# Patient Record
Sex: FEMALE | Race: BLACK OR AFRICAN AMERICAN
[De-identification: names, ages, dates, MRNs, and addresses within clinical notes are randomized per-mention and may not be internally consistent; named-entity substitution may affect disease eponyms.]

---

## 2017-12-31 ENCOUNTER — HOSPITAL ENCOUNTER (EMERGENCY)
Dept: HOSPITAL 84 - D.ER | Age: 67
Discharge: HOME | End: 2017-12-31
Payer: MEDICAID

## 2017-12-31 DIAGNOSIS — N39.0: Primary | ICD-10-CM

## 2017-12-31 LAB
ALBUMIN SERPL-MCNC: 3.7 G/DL (ref 3.4–5)
ALP SERPL-CCNC: 125 U/L (ref 46–116)
ALT SERPL-CCNC: 19 U/L (ref 10–68)
ANION GAP SERPL CALC-SCNC: 11.7 MMOL/L (ref 8–16)
APPEARANCE UR: (no result)
BACTERIA #/AREA URNS HPF: (no result) /HPF
BASOPHILS NFR BLD AUTO: 0.2 % (ref 0–2)
BILIRUB SERPL-MCNC: 0.57 MG/DL (ref 0.2–1.3)
BILIRUB SERPL-MCNC: NEGATIVE MG/DL
BUN SERPL-MCNC: 6 MG/DL (ref 7–18)
CALCIUM SERPL-MCNC: 9.6 MG/DL (ref 8.5–10.1)
CHLORIDE SERPL-SCNC: 103 MMOL/L (ref 98–107)
CO2 SERPL-SCNC: 29.8 MMOL/L (ref 21–32)
COLOR UR: YELLOW
CREAT SERPL-MCNC: 0.5 MG/DL (ref 0.6–1.3)
EOSINOPHIL NFR BLD: 2.8 % (ref 0–7)
ERYTHROCYTE [DISTWIDTH] IN BLOOD BY AUTOMATED COUNT: 19.5 % (ref 11.5–14.5)
GLOBULIN SER-MCNC: 4.2 G/L
GLUCOSE SERPL-MCNC: 104 MG/DL (ref 74–106)
GLUCOSE SERPL-MCNC: NEGATIVE MG/DL
HCT VFR BLD CALC: 32.7 % (ref 36–48)
HGB BLD-MCNC: 11.9 G/DL (ref 12–16)
IMM GRANULOCYTES NFR BLD: 0.1 % (ref 0–5)
KETONES UR STRIP-MCNC: (no result) MG/DL
LYMPHOCYTES NFR BLD AUTO: 21.6 % (ref 15–50)
MCH RBC QN AUTO: 24.9 PG (ref 26–34)
MCHC RBC AUTO-ENTMCNC: 36.4 G/DL (ref 31–37)
MCV RBC: 68.4 FL (ref 80–100)
MONOCYTES NFR BLD: 7.1 % (ref 2–11)
NEUTROPHILS NFR BLD AUTO: 68.2 % (ref 40–80)
NITRITE UR-MCNC: NEGATIVE MG/ML
OSMOLALITY SERPL CALC.SUM OF ELEC: 276 MOSM/KG (ref 275–300)
PH UR STRIP: 7 [PH] (ref 5–6)
PLATELET # BLD: 226 10X3/UL (ref 130–400)
PMV BLD AUTO: 9.3 FL (ref 7.4–10.4)
POTASSIUM SERPL-SCNC: 4.5 MMOL/L (ref 3.5–5.1)
PROT SERPL-MCNC: 7.9 G/DL (ref 6.4–8.2)
PROT UR-MCNC: NEGATIVE MG/DL
RBC # BLD AUTO: 4.78 10X6/UL (ref 4–5.4)
RBC #/AREA URNS HPF: (no result) /HPF (ref 0–5)
SODIUM SERPL-SCNC: 140 MMOL/L (ref 136–145)
SP GR UR STRIP: 1 (ref 1–1.02)
UROBILINOGEN UR-MCNC: NORMAL MG/DL
WBC # BLD AUTO: 8.2 10X3/UL (ref 4.8–10.8)
WBC #/AREA URNS HPF: >50 /HPF (ref 0–5)

## 2018-06-27 ENCOUNTER — HOSPITAL ENCOUNTER (OUTPATIENT)
Dept: HOSPITAL 84 - D.CT | Age: 68
Discharge: HOME | End: 2018-06-27
Attending: FAMILY MEDICINE
Payer: MEDICAID

## 2018-06-27 VITALS — BODY MASS INDEX: 37.3 KG/M2

## 2018-06-27 DIAGNOSIS — N93.9: Primary | ICD-10-CM

## 2018-08-02 ENCOUNTER — HOSPITAL ENCOUNTER (EMERGENCY)
Dept: HOSPITAL 84 - D.ER | Age: 68
Discharge: HOME | End: 2018-08-02
Payer: MEDICAID

## 2018-08-02 VITALS — SYSTOLIC BLOOD PRESSURE: 127 MMHG | DIASTOLIC BLOOD PRESSURE: 63 MMHG

## 2018-08-02 VITALS — HEIGHT: 63 IN | BODY MASS INDEX: 33.73 KG/M2 | WEIGHT: 190.4 LBS

## 2018-08-02 DIAGNOSIS — N39.0: Primary | ICD-10-CM

## 2018-08-02 DIAGNOSIS — Z86.73: ICD-10-CM

## 2018-08-02 DIAGNOSIS — E07.9: ICD-10-CM

## 2018-08-02 DIAGNOSIS — D57.1: ICD-10-CM

## 2018-08-02 LAB
ALBUMIN SERPL-MCNC: 3.1 G/DL (ref 3.4–5)
ALP SERPL-CCNC: 106 U/L (ref 46–116)
ALT SERPL-CCNC: 13 U/L (ref 10–68)
AMYLASE SERPL-CCNC: 32 U/L (ref 25–115)
ANION GAP SERPL CALC-SCNC: 9.6 MMOL/L (ref 8–16)
APPEARANCE UR: (no result)
BACTERIA #/AREA URNS HPF: (no result) /HPF
BASOPHILS NFR BLD AUTO: 0.2 % (ref 0–2)
BILIRUB SERPL-MCNC: 0.76 MG/DL (ref 0.2–1.3)
BILIRUB SERPL-MCNC: NEGATIVE MG/DL
BUN SERPL-MCNC: 9 MG/DL (ref 7–18)
CALCIUM SERPL-MCNC: 8.7 MG/DL (ref 8.5–10.1)
CHLORIDE SERPL-SCNC: 102 MMOL/L (ref 98–107)
CO2 SERPL-SCNC: 28.4 MMOL/L (ref 21–32)
COLOR UR: YELLOW
CREAT SERPL-MCNC: 0.4 MG/DL (ref 0.6–1.3)
EOSINOPHIL NFR BLD: 2.6 % (ref 0–7)
ERYTHROCYTE [DISTWIDTH] IN BLOOD BY AUTOMATED COUNT: 19.5 % (ref 11.5–14.5)
GLOBULIN SER-MCNC: 4 G/L
GLUCOSE SERPL-MCNC: 93 MG/DL (ref 74–106)
GLUCOSE SERPL-MCNC: NEGATIVE MG/DL
HCT VFR BLD CALC: 30.1 % (ref 36–48)
HGB BLD-MCNC: 11 G/DL (ref 12–16)
IMM GRANULOCYTES NFR BLD: 0.2 % (ref 0–5)
KETONES UR STRIP-MCNC: NEGATIVE MG/DL
LIPASE SERPL-CCNC: 87 U/L (ref 73–393)
LYMPHOCYTES NFR BLD AUTO: 16 % (ref 15–50)
MCH RBC QN AUTO: 24 PG (ref 26–34)
MCHC RBC AUTO-ENTMCNC: 36.5 G/DL (ref 31–37)
MCV RBC: 65.7 FL (ref 80–100)
MONOCYTES NFR BLD: 5.5 % (ref 2–11)
MUCOUS THREADS #/AREA URNS LPF: (no result) /LPF
NEUTROPHILS NFR BLD AUTO: 75.5 % (ref 40–80)
NITRITE UR-MCNC: POSITIVE MG/ML
OSMOLALITY SERPL CALC.SUM OF ELEC: 270 MOSM/KG (ref 275–300)
PH UR STRIP: 7 [PH] (ref 5–6)
PLATELET # BLD: 196 10X3/UL (ref 130–400)
PMV BLD AUTO: 9.4 FL (ref 7.4–10.4)
POTASSIUM SERPL-SCNC: 4 MMOL/L (ref 3.5–5.1)
PROT SERPL-MCNC: 7.1 G/DL (ref 6.4–8.2)
PROT UR-MCNC: NEGATIVE MG/DL
RBC # BLD AUTO: 4.58 10X6/UL (ref 4–5.4)
SODIUM SERPL-SCNC: 136 MMOL/L (ref 136–145)
SP GR UR STRIP: 1.01 (ref 1–1.02)
SQUAMOUS #/AREA URNS HPF: (no result) /HPF (ref 0–5)
UROBILINOGEN UR-MCNC: NORMAL MG/DL
WBC # BLD AUTO: 8.8 10X3/UL (ref 4.8–10.8)
WBC #/AREA URNS HPF: (no result) /HPF (ref 0–5)

## 2019-11-13 ENCOUNTER — HOSPITAL ENCOUNTER (INPATIENT)
Dept: HOSPITAL 84 - D.ER | Age: 69
LOS: 2 days | Discharge: NURSING, REHAB OR LONG-TERM CARE | DRG: 392 | End: 2019-11-15
Attending: FAMILY MEDICINE | Admitting: FAMILY MEDICINE
Payer: MEDICARE

## 2019-11-13 VITALS — DIASTOLIC BLOOD PRESSURE: 74 MMHG | SYSTOLIC BLOOD PRESSURE: 182 MMHG

## 2019-11-13 VITALS — SYSTOLIC BLOOD PRESSURE: 153 MMHG | DIASTOLIC BLOOD PRESSURE: 73 MMHG

## 2019-11-13 VITALS — BODY MASS INDEX: 31.57 KG/M2 | WEIGHT: 196.41 LBS | BODY MASS INDEX: 31.57 KG/M2 | HEIGHT: 66 IN

## 2019-11-13 VITALS — DIASTOLIC BLOOD PRESSURE: 103 MMHG | SYSTOLIC BLOOD PRESSURE: 139 MMHG

## 2019-11-13 VITALS — SYSTOLIC BLOOD PRESSURE: 176 MMHG | DIASTOLIC BLOOD PRESSURE: 72 MMHG

## 2019-11-13 DIAGNOSIS — D57.1: ICD-10-CM

## 2019-11-13 DIAGNOSIS — I10: ICD-10-CM

## 2019-11-13 DIAGNOSIS — K59.00: Primary | ICD-10-CM

## 2019-11-13 DIAGNOSIS — Z86.73: ICD-10-CM

## 2019-11-13 DIAGNOSIS — D35.01: ICD-10-CM

## 2019-11-13 DIAGNOSIS — K21.9: ICD-10-CM

## 2019-11-13 LAB
AMYLASE SERPL-CCNC: 39 U/L (ref 25–115)
ANION GAP SERPL CALC-SCNC: 15.3 MMOL/L (ref 8–16)
APPEARANCE UR: CLEAR
BACTERIA #/AREA URNS HPF: (no result) /HPF
BASOPHILS NFR BLD AUTO: 0.2 % (ref 0–2)
BILIRUB SERPL-MCNC: NEGATIVE MG/DL
BUN SERPL-MCNC: 18 MG/DL (ref 7–18)
CALCIUM SERPL-MCNC: 8.9 MG/DL (ref 8.5–10.1)
CHLORIDE SERPL-SCNC: 106 MMOL/L (ref 98–107)
CO2 SERPL-SCNC: 25.8 MMOL/L (ref 21–32)
COLOR UR: YELLOW
CREAT SERPL-MCNC: 0.5 MG/DL (ref 0.6–1.3)
EOSINOPHIL NFR BLD: 3.2 % (ref 0–7)
ERYTHROCYTE [DISTWIDTH] IN BLOOD BY AUTOMATED COUNT: 20.3 % (ref 11.5–14.5)
GLUCOSE SERPL-MCNC: 96 MG/DL (ref 74–106)
GLUCOSE SERPL-MCNC: NEGATIVE MG/DL
HCT VFR BLD CALC: 33.6 % (ref 36–48)
HGB BLD-MCNC: 12.2 G/DL (ref 12–16)
IMM GRANULOCYTES NFR BLD: 0.2 % (ref 0–5)
KETONES UR STRIP-MCNC: NEGATIVE MG/DL
LIPASE SERPL-CCNC: 90 U/L (ref 73–393)
LYMPHOCYTES NFR BLD AUTO: 20.3 % (ref 15–50)
MCH RBC QN AUTO: 25.3 PG (ref 26–34)
MCHC RBC AUTO-ENTMCNC: 36.3 G/DL (ref 31–37)
MCV RBC: 69.6 FL (ref 80–100)
MONOCYTES NFR BLD: 6.3 % (ref 2–11)
NEUTROPHILS NFR BLD AUTO: 69.8 % (ref 40–80)
NITRITE UR-MCNC: NEGATIVE MG/ML
OSMOLALITY SERPL CALC.SUM OF ELEC: 286 MOSM/KG (ref 275–300)
PH UR STRIP: 6 [PH] (ref 5–6)
PLATELET # BLD: 188 10X3/UL (ref 130–400)
PMV BLD AUTO: 9.6 FL (ref 7.4–10.4)
POTASSIUM SERPL-SCNC: 4.1 MMOL/L (ref 3.5–5.1)
PROT UR-MCNC: NEGATIVE MG/DL
RBC # BLD AUTO: 4.83 10X6/UL (ref 4–5.4)
RBC #/AREA URNS HPF: (no result) /HPF (ref 0–5)
SODIUM SERPL-SCNC: 143 MMOL/L (ref 136–145)
SP GR UR STRIP: 1.01 (ref 1–1.02)
SQUAMOUS #/AREA URNS HPF: (no result) /HPF (ref 0–5)
TROPONIN I SERPL-MCNC: < 0.017 NG/ML (ref 0–0.06)
UROBILINOGEN UR-MCNC: NORMAL MG/DL
WBC # BLD AUTO: 8.1 10X3/UL (ref 4.8–10.8)
WBC #/AREA URNS HPF: (no result) /HPF

## 2019-11-13 NOTE — NUR
IN AND OUT CATH FOR URINE SPECIMEN. STERILE FIELD MAINTAINED. APPROX 50 ML OF
CLEAR, YELLOW URINE AT INSERTION, SAMPLE SENT TO LAB, PT TOLERATED WELL. DENIES
NEEDS, CALL LIGHT WITHIN REACH, WILL CONTINUE TO MONITOR.

## 2019-11-14 VITALS — SYSTOLIC BLOOD PRESSURE: 172 MMHG | DIASTOLIC BLOOD PRESSURE: 82 MMHG

## 2019-11-14 VITALS — SYSTOLIC BLOOD PRESSURE: 156 MMHG | DIASTOLIC BLOOD PRESSURE: 89 MMHG

## 2019-11-14 VITALS — DIASTOLIC BLOOD PRESSURE: 70 MMHG | SYSTOLIC BLOOD PRESSURE: 143 MMHG

## 2019-11-14 VITALS — SYSTOLIC BLOOD PRESSURE: 159 MMHG | DIASTOLIC BLOOD PRESSURE: 77 MMHG

## 2019-11-14 VITALS — SYSTOLIC BLOOD PRESSURE: 150 MMHG | DIASTOLIC BLOOD PRESSURE: 78 MMHG

## 2019-11-14 VITALS — SYSTOLIC BLOOD PRESSURE: 162 MMHG | DIASTOLIC BLOOD PRESSURE: 75 MMHG

## 2019-11-14 LAB
ALBUMIN SERPL-MCNC: 3.5 G/DL (ref 3.4–5)
ALP SERPL-CCNC: 119 U/L (ref 46–116)
ALT SERPL-CCNC: 9 U/L (ref 10–68)
ANION GAP SERPL CALC-SCNC: 10.8 MMOL/L (ref 8–16)
BASOPHILS NFR BLD AUTO: 0.3 % (ref 0–2)
BILIRUB SERPL-MCNC: 0.79 MG/DL (ref 0.2–1.3)
BUN SERPL-MCNC: 11 MG/DL (ref 7–18)
CALCIUM SERPL-MCNC: 8.8 MG/DL (ref 8.5–10.1)
CHLORIDE SERPL-SCNC: 107 MMOL/L (ref 98–107)
CO2 SERPL-SCNC: 27.1 MMOL/L (ref 21–32)
CREAT SERPL-MCNC: 0.3 MG/DL (ref 0.6–1.3)
EOSINOPHIL NFR BLD: 4.4 % (ref 0–7)
ERYTHROCYTE [DISTWIDTH] IN BLOOD BY AUTOMATED COUNT: 20.4 % (ref 11.5–14.5)
GLOBULIN SER-MCNC: 4.1 G/L
GLUCOSE SERPL-MCNC: 96 MG/DL (ref 74–106)
HCT VFR BLD CALC: 33.8 % (ref 36–48)
HGB BLD-MCNC: 12.1 G/DL (ref 12–16)
IMM GRANULOCYTES NFR BLD: 0.3 % (ref 0–5)
LYMPHOCYTES NFR BLD AUTO: 27.8 % (ref 15–50)
MCH RBC QN AUTO: 24.8 PG (ref 26–34)
MCHC RBC AUTO-ENTMCNC: 35.8 G/DL (ref 31–37)
MCV RBC: 69.3 FL (ref 80–100)
MONOCYTES NFR BLD: 5.8 % (ref 2–11)
NEUTROPHILS NFR BLD AUTO: 61.4 % (ref 40–80)
OSMOLALITY SERPL CALC.SUM OF ELEC: 279 MOSM/KG (ref 275–300)
PLATELET # BLD: 162 10X3/UL (ref 130–400)
POTASSIUM SERPL-SCNC: 3.9 MMOL/L (ref 3.5–5.1)
PROT SERPL-MCNC: 7.6 G/DL (ref 6.4–8.2)
RBC # BLD AUTO: 4.88 10X6/UL (ref 4–5.4)
SODIUM SERPL-SCNC: 141 MMOL/L (ref 136–145)
WBC # BLD AUTO: 7.1 10X3/UL (ref 4.8–10.8)

## 2019-11-14 NOTE — NUR
REPORT RECEIVED, WILL CONTINUE POC. PATIENT IS AAOX4, SEMI-FOWLERS POSITION.
CNA AT BEDSIDE, PATIENT JUST HAD BM AND WAS CLEANED UP. NO S/S OF DISTRESS
OBSERVED, RR EVEN AND UNLABORED ON ROOM AIR. PATIENT ASKING FOR TYLENOL.
PATIENT DENIES OTHER NEEDS. CL IN REACH, BED LOCKED AND LOWERED. WILL CTM.

## 2019-11-14 NOTE — NUR
PT HAS A 3 LARGE LIGHT BROWN LIQUID BM SINCE 0800. SPOKE TO RICCO DICKENS, HE DID
NOT WANT ME TO GIVE THE SOAP JOSUE ENEMA AT THIS TIME. WLL CTM

## 2019-11-14 NOTE — NUR
0015) rec'd via stretcher from er alert and oriented speech garbled at times
talks very fast.incontinent of urine pericare and linen chge.no shinbreakage
noted. will continue to observe for any chges and follow current plan of care.

## 2019-11-14 NOTE — NUR
REPORT RECIEVED. PT RESTING QUIETLY. RISE AND FALL OF CHEST NOTED. PT HAS A R
HAND PIV THAT IS SL. RR EVEN AND UNLABORED NO DISTRESS NOTED. BED LOCKED AND
IN LOWEST POSITION, CALL LIGHT WITHIN REACH. WILL CTM

## 2019-11-15 VITALS — SYSTOLIC BLOOD PRESSURE: 151 MMHG | DIASTOLIC BLOOD PRESSURE: 51 MMHG

## 2019-11-15 VITALS — SYSTOLIC BLOOD PRESSURE: 145 MMHG | DIASTOLIC BLOOD PRESSURE: 71 MMHG

## 2019-11-15 VITALS — SYSTOLIC BLOOD PRESSURE: 162 MMHG | DIASTOLIC BLOOD PRESSURE: 91 MMHG

## 2019-11-15 NOTE — NUR
PT DISCHARGED BACK TO [facility] VIA STRETCHER WITH [employer]
TRANSPORT. ALL BELONGINGS TAKEN WITH PT. PIV REMOVED WITH CATHETER TIP FULLY
INTACT. ATTEMPTED TO CALL REPORT FIVE DIFFERENT TIMES AND WAS NEVER ABLE TO
GET IN TOUCH. DISCHARGE PAPERWORK SENT WITH [employer] EMPLOYEES.

## 2019-11-15 NOTE — MORECARE
CASE MANAGEMENT DISCHARGE SUMMARY
 
 
PATIENT: CARMEN HEBERT                       UNIT: [id number]
ACCOUNT#: [id number]                       ADM DATE: 19
AGE: 69     : 52  SEX: F            ROOM/BED: D.2104    
AUTHOR: LEE,DOC                             PHYSICIAN:                               
 
REFERRING PHYSICIAN: SMITA RAMOS MD            
DATE OF SERVICE: 11/15/19
Discharge Plan
 
 
Patient Name: CARMEN HEBERT
Facility: [facility]:[place]
Encounter #: [id number]
Medical Record #: [id number]
: 1950
Planned Disposition: Nursing Facility ADAN Cert
Anticipated Discharge Date: 11/15/19
 
Discharge Date: 11/15/2019
Expected LOS: 1
Initial Reviewer: [email or web address]
Initial Review Date: 2019
Generated: 11/15/19   1:23 pm 
Comments
 
DCP- Discharge Planning
 
Updated by [id number]: Carroll Be on 11/15/19   9:08 am CT
Patient Name: CARMEN HEBERT                                     
Admission Status: ER   
Accout number: [id number]                              
Admission Date: 2019   
: 1950                                                        
Admission Diagnosis:   
Attending: SMITA RAMOS                                                
Current LOS:  1   
  
Anticipated DC Date: 11-   
Planned Disposition: Nursing Facility ADAN Cert   
Primary Insurance: MEDICARE A & B   
PLANNED EXTERNAL PROVIDER:  [facility], LONG TERM CARE 
RETURN  
  
Discharge Planning Comments:   
CM RECEIVED DISCHARGE ORDER, MET WITH PT IN ROOM.  PT REPOR[place] [facility] [place]".  PT'S EMERGENCY CONTACT IS MONDAY.  
PT FEELS SAFE AT THE NURSING HOME AND WILL RETURN THERE TODAY.  PT WAS NOT 
 
ABLE TO SIGN THE CONSENT FORM.  CM CALLED PT'S DAUGHTER, MONDAY MAX, 
[phone], LEFT MESSAGE ASKING FOR RETURN CALL.  
  
CM CALLED [facility], [phone], SPOKE TO ESME, NURSE, 
WHO INFORMED CM THAT PT IS [facility] LONG TERM CARE PATIENT.  THEY WILL 
ACCEPT TODAY.  CM FAXED DISCHARGE INFORMATION TO [facility], [phone].  
  
NURSE REPORT TO BE CALLED TO ESME OF [facility], 
[phone].  PT TO TRANSPORT VIA AMBULANCE.  
  
: Carroll Be
 DCPIA - Discharge Planning Initial Assessment
 
Updated by [id number]: Carroll Be on 11/15/19  10:01 am
*  Is the patient Alert and Oriented?
Yes
*  How many steps to enter\exit or inside your home? NONE *  PCP DR. RAMOS *  Pharmacy
[facility] [place]
*  Preadmission Environment
Long Term Nursing Home
*  Facility Name
[facility]
*  ADLs
Total Dependent
*  Equipment
Other
*  Other Equipment
ALL MEDICAL EQUIPMENT PROVIDED BY [employer]
*  List name and contact numbers for known caregivers / representatives who 
currently or will assist patient after discharge:
MONDAY VASQUEZ SANTANA, [phone]
*  Verbal permission to speak to the caregivers and representatives has been 
obtained from the patient.
Yes
*  Community resources currently utilized
None
*  Please name any agencies selected above.
NONE
*  Additional services required to return to the preadmission environment?
No
*  Can the patient safely return to the preadmission environment?
Yes
*  Has this patient been hospitalized within the prior 30 days at any 
hospital?
No
 
 
 
 
 
Coverage Notice
 
 
Reviewer: [id number] - Carroll Be
 
Notice Issued Date-Time: 11/15/2019   9:23
Notice Type: Patient Choice Letter
 
Notice Delivered To: Patient
Relationship to Patient: 
Representative Name: 
 
Delivery Method: HAND - Hand Delivered
Rosaura Days:
Prior Verbal Notification: 
 
Recipient Understood Notice: Yes
Recipient Signature: 
Med Rec Note Co-signed by Attending:
 
Coverage Notice Comment:  [facility]
 
Last DP export: 11/15/19   9:10 
Patient Name: CARMEN HEBERT
Encounter #: [id number]
Page [id number]
 
 
 
 
 
Electronically Signed by EMILY HOWARD on 11/15/19 at 1223
 
 
 
 
 
 
**All edits/amendments must be made on the electronic document**
 
DICTATION DATE: 11/15/19 1223     : ALY  11/15/19 1223     
RPT#: [id number]                                DC DATE:11/15/19
                                               STATUS: DIS IN  
[facility]
[address] [place] [address]
***END OF REPORT***

## 2019-11-15 NOTE — MORECARE
CASE MANAGEMENT DISCHARGE SUMMARY
 
 
PATIENT: CARMEN HEBERT                       UNIT: [id number]
ACCOUNT#: [id number]                       ADM DATE: 19
AGE: 69     : 52  SEX: F            ROOM/BED: D.2104    
AUTHOR: EMILY HOWARD                             PHYSICIAN:                               
 
REFERRING PHYSICIAN: SMITA RAMOS MD            
DATE OF SERVICE: 11/15/19
Discharge Plan
 
 
Patient Name: CARMEN HEBERT
[facility]
Encounter #: [id number]
Medical Record #: [id number]
: 1950
Planned Disposition: Nursing Facility ADAN Cert
Anticipated Discharge Date: 11/15/19
 
Discharge Date: 
Expected LOS: 1
Initial Reviewer: [email or web address]
Initial Review Date: 2019
Generated: 11/15/19  10:56 am 
  
 
 
External Providers
External Provider: [facility]
 
Next Contact Date: 11/15/2019
Service Request Date: 
Service Type: 
Resolution: 
 
Reviewer: 
Comments: 
 
 
 
 
 
Patient Name: CARMEN HEBERT
 
Encounter #: [id number]
Page [id number]
 
 
 
 
 
Electronically Signed by EMILY HOWARD on 11/15/19 at 0956
 
 
 
 
 
 
**All edits/amendments must be made on the electronic document**
 
DICTATION DATE: 11/15/19 0956     : ALY  11/15/19 0956     
RPT#: [id number]                                SC DATE:        
                                               STATUS: ADM IN  
[facility]
 [place] [address]
***END OF REPORT***

## 2019-11-15 NOTE — NUR
ATTEMPTED TO CALL REPORT TO [facility] TWICE. NO ANSWER.
WILL CONTINUE ATTEMPTING TO CALL. WILL CTM.

## 2019-11-15 NOTE — MORECARE
CASE MANAGEMENT DISCHARGE SUMMARY
 
 
PATIENT: CARMEN HEBERT                       UNIT: [id number]
ACCOUNT#: [id number]                       ADM DATE: 19
AGE: 69     : 52  SEX: F            ROOM/BED: D.2928    
AUTHOR: LEE,DOC                             PHYSICIAN:                               
 
REFERRING PHYSICIAN: SMITA RAMOS MD            
DATE OF SERVICE: 11/15/19
Discharge Plan
 
 
Patient Name: CARMEN HEBERT
Facility: [facility]:[place]
Encounter #: [id number]
Medical Record #: [id number]
: 1950
Planned Disposition: Nursing Facility ADAN Cert
Anticipated Discharge Date: 11/15/19
 
Discharge Date: 
Expected LOS: 1
Initial Reviewer: [email or web address]
Initial Review Date: 2019
Generated: 11/15/19  11:09 am 
Comments
 
DCP- Discharge Planning
 
Updated by [id number]: Carroll Be on 11/15/19   9:08 am CT
Patient Name: CARMEN HEBERT                                     
Admission Status: ER   
Accout number: [id number]                              
Admission Date: 2019   
: 1950                                                        
Admission Diagnosis:   
Attending: SMITA RAMOS                                                
Current LOS:  1   
  
Anticipated DC Date: 11-   
Planned Disposition: Nursing Facility ADAN Cert   
Primary Insurance: MEDICARE A & B   
PLANNED EXTERNAL PROVIDER:  [facility], LONG TERM CARE 
RETURN  
  
Discharge Planning Comments:   
CM RECEIVED DISCHARGE ORDER, MET WITH PT IN ROOM.  PT REPORTS LIVING AT [facility] FOR A "LONG TIME".  PT'S EMERGENCY CONTACT IS MONDAY.  
PT FEELS SAFE AT THE NURSING HOME AND WILL RETURN THERE TODAY.  PT WAS NOT 
 
ABLE TO SIGN THE CONSENT FORM.  CM CALLED PT'S DAUGHTER, MONDAY MAX, 
[phone], LEFT MESSAGE ASKING FOR RETURN CALL.  
  
CM CALLED [facility], [phone], SPOKE TO ESME, NURSE, 
WHO INFORMED CM THAT PT IS [facility] LONG TERM CARE PATIENT.  THEY WILL 
ACCEPT TODAY.  CM FAXED DISCHARGE INFORMATION TO [facility], [phone].  
  
NURSE REPORT TO BE CALLED TO ESME OF [facility], 
[phone].  PT TO TRANSPORT VIA AMBULANCE.  
  
: Carroll Be
 DCPIA - Discharge Planning Initial Assessment
 
Updated by [id number]: Carroll Be on 11/15/19  10:01 am
*  Is the patient Alert and Oriented?
Yes
*  How many steps to enter\exit or inside your home? NONE *  PCP DR. RAMOS *  Pharmacy
[facility] [place]
*  Preadmission Environment
Long Term Nursing Home
*  Facility Name
[facility]
*  ADLs
Total Dependent
*  Equipment
Other
*  Other Equipment
ALL MEDICAL EQUIPMENT PROVIDED BY [employer]
*  List name and contact numbers for known caregivers / representatives who 
currently or will assist patient after discharge:
MONDAY CHANELLE SANTANAR, [phone]
*  Verbal permission to speak to the caregivers and representatives has been 
obtained from the patient.
Yes
*  Community resources currently utilized
None
*  Please name any agencies selected above.
NONE
*  Additional services required to return to the preadmission environment?
No
*  Can the patient safely return to the preadmission environment?
Yes
*  Has this patient been hospitalized within the prior 30 days at any 
hospital?
No
 
 
 
 
 
Coverage Notice
 
 
Reviewer: [id number] - Carroll Be
 
Notice Issued Date-Time: 11/15/2019   9:23
Notice Type: Patient Choice Letter
 
Notice Delivered To: Patient
Relationship to Patient: 
Representative Name: 
 
Delivery Method: HAND - Hand Delivered
Rosaura Days:
Prior Verbal Notification: 
 
Recipient Understood Notice: Yes
Recipient Signature: 
Med Rec Note Co-signed by Attending:
 
Coverage Notice Comment:  [facility]
 
Last DP export: 11/15/19   9:03 
Patient Name: CARMEN HEBERT
Encounter #: [id number]
Page [id number]
 
 
 
 
 
Electronically Signed by EMILY HOWARD on 11/15/19 at 1010
 
 
 
 
 
 
**All edits/amendments must be made on the electronic document**
 
DICTATION DATE: 11/15/19 100     : ALY  11/15/19 100     
RPT#: [id number]                                DC DATE:        
                                               STATUS: ADM IN  
[facility]
[address] [place] [address]
***END OF REPORT***

## 2019-11-15 NOTE — MORECARE
CASE MANAGEMENT DISCHARGE SUMMARY
 
 
PATIENT: CARMEN HEBERT                       UNIT: [id number]
ACCOUNT#: [id number]                       ADM DATE: 19
AGE: 69     : 52  SEX: F            ROOM/BED: D.2101    
AUTHOR: EMILY HOWARD                             PHYSICIAN:                               
 
REFERRING PHYSICIAN: SMITA RAMOS MD            
DATE OF SERVICE: 11/15/19
Discharge Plan
 
 
Patient Name: CARMEN HEBERT
[facility]
Encounter #: [id number]
Medical Record #: [id number]
: 1950
Planned Disposition: Nursing Facility ADAN Cert
Anticipated Discharge Date: 11/15/19
 
Discharge Date: 
Expected LOS: 1
Initial Reviewer: [email or web address]
Initial Review Date: 2019
Generated: 11/15/19  11:02 am 
 DCPIA - Discharge Planning Initial Assessment
 
Updated by [id number]: Carroll Be on 11/15/19  10:01 am
*  Is the patient Alert and Oriented?
Yes
*  How many steps to enter\exit or inside your home? NONE *  PCP DR. RAMOS *  Pharmacy
[facility] [place]
*  Preadmission Environment
Long Term Nursing Home
*  Facility Name
[facility]
*  ADLs
Total Dependent
*  Equipment
Other
*  Other Equipment
[facility]
*  List name and contact numbers for known caregivers / representatives who 
currently or will assist patient after discharge:
MONDAY CHANELLE SANTANAR, [phone]
*  Verbal permission to speak to the caregivers and representatives has been 
obtained from the patient.
Yes
*  Community resources currently utilized
 
None
*  Please name any agencies selected above.
NONE
*  Additional services required to return to the preadmission environment?
No
*  Can the patient safely return to the preadmission environment?
Yes
*  Has this patient been hospitalized within the prior 30 days at any 
hospital?
No
 
 
 
 
 
 
 
Last DP export: 11/15/19   8:56 
Patient Name: CARMEN HEBERT
Encounter #: [id number]
Page [id number]
 
 
 
 
 
Electronically Signed by EMILY HOWARD on 11/15/19 at 1003
 
 
 
 
 
 
**All edits/amendments must be made on the electronic document**
 
DICTATION DATE: 11/15/19 1002     : ALY  11/15/19 1002     
RPT#: [id number]                                DC DATE:        
                                               STATUS: ADM IN  
[facility]
[address] [place] [address]
***END OF REPORT***

## 2019-11-15 NOTE — NUR
REPORT RECEIVED. WILL CONTINUE WITH POC. PT CURRENTLY LYING SUPINE. CALL LIGHT
W/I REACH. PT IS AAO. RR EVEN AND UNLABORED ON RA. R.HAND PIV IS SALINE
LOCKED. NO S/S OF DISTRESS NOTED. PT DENIES ANY NEEDS. WILL CTM.

## 2019-11-22 ENCOUNTER — HOSPITAL ENCOUNTER (OUTPATIENT)
Dept: HOSPITAL 84 - D.CT | Age: 69
Discharge: HOME | End: 2019-11-22
Attending: FAMILY MEDICINE
Payer: MEDICARE

## 2019-11-22 VITALS — BODY MASS INDEX: 31.7 KG/M2

## 2019-11-22 DIAGNOSIS — K59.00: Primary | ICD-10-CM
